# Patient Record
Sex: MALE | Race: WHITE | NOT HISPANIC OR LATINO | URBAN - METROPOLITAN AREA
[De-identification: names, ages, dates, MRNs, and addresses within clinical notes are randomized per-mention and may not be internally consistent; named-entity substitution may affect disease eponyms.]

---

## 2023-03-10 ENCOUNTER — OFFICE VISIT (OUTPATIENT)
Dept: CARDIOTHORACIC SURGERY | Facility: CLINIC | Age: 63
End: 2023-03-10
Payer: MEDICARE

## 2023-03-10 DIAGNOSIS — I35.0 NONRHEUMATIC AORTIC VALVE STENOSIS: Primary | ICD-10-CM

## 2023-03-10 PROCEDURE — 99205 OFFICE O/P NEW HI 60 MIN: CPT | Mod: S$GLB,,, | Performed by: THORACIC SURGERY (CARDIOTHORACIC VASCULAR SURGERY)

## 2023-03-10 PROCEDURE — 99205 PR OFFICE/OUTPT VISIT, NEW, LEVL V, 60-74 MIN: ICD-10-PCS | Mod: S$GLB,,, | Performed by: THORACIC SURGERY (CARDIOTHORACIC VASCULAR SURGERY)

## 2023-03-10 RX ORDER — LEVOTHYROXINE SODIUM 150 UG/1
150 TABLET ORAL
COMMUNITY

## 2023-03-10 RX ORDER — ATORVASTATIN CALCIUM 80 MG/1
80 TABLET, FILM COATED ORAL DAILY
COMMUNITY

## 2023-03-10 RX ORDER — ASPIRIN 81 MG/1
81 TABLET ORAL DAILY
COMMUNITY

## 2023-03-10 RX ORDER — METOPROLOL TARTRATE 25 MG/1
25 TABLET, FILM COATED ORAL DAILY
COMMUNITY

## 2023-03-10 NOTE — PROGRESS NOTES
.  Subjective:      Patient ID: Francisco Mcgovern is a 62 y.o. male who presents for evaluation of aortic stenosis coronary artery disease    Chief Complaint: new patient    Patient is a 62-year-old male whose past medical history including hypertension, hyperlipidemia, diabetes type 2 with hemoglobin A1c of 7%, prior smoking, CAD showing three-vessel coronary disease, right dominant, total occlusion of the mid LAD, chronic occlusion of the proximal RCA, moderate stenosis of 1st diagonal branch, and patent stent of the circumflex and obtuse marginal 1 via LHC done on 2/2/23, aortic valve stenosis with echo done in January 2023 showing maximum velocity across the aortic valve is 4.17, mean gradient of 39.25, and aortic root of 3.9 cm, presented to our clinic as an outpatient referral from Dr. Angeles's office for aortic stenosis and coronary artery disease.  Patient has occasional chest pain on and off with dizziness and relieved with rest.  Does have occasional leg edema.    Review of Systems   Constitutional: Negative.   HENT: Negative.     Eyes: Negative.    Cardiovascular:  Positive for dyspnea on exertion, leg swelling (occasionally, at the end of the day mostly.) and syncope (near-syncope).   Respiratory:  Positive for shortness of breath (With exertion, relieved with rest).    Skin: Negative.    Gastrointestinal: Negative.    Genitourinary: Negative.    Neurological:  Positive for dizziness and light-headedness.   Psychiatric/Behavioral: Negative.      Past Medical History:   Diagnosis Date    Hyperlipidemia     Hypertension     Hypothyroidism, unspecified       Past Surgical History:   Procedure Laterality Date    TOTAL SHOULDER ARTHROPLASTY Right       Family History   Problem Relation Age of Onset    Aneurysm Mother     Diabetes Maternal Grandmother     Diabetes Paternal Grandmother       Social History     Socioeconomic History    Marital status: Unknown   Tobacco Use    Smoking status: Every Day      Packs/day: 0.50     Years: 40.00     Pack years: 20.00     Types: Cigarettes   Substance and Sexual Activity    Alcohol use: Not Currently    Drug use: Yes     Types: Marijuana        Medication List with Changes/Refills   Current Medications    ASPIRIN (ECOTRIN) 81 MG EC TABLET    Take 81 mg by mouth once daily.    ATORVASTATIN (LIPITOR) 80 MG TABLET    Take 80 mg by mouth once daily.    LEVOTHYROXINE (SYNTHROID) 150 MCG TABLET    Take 150 mcg by mouth before breakfast.    METOPROLOL TARTRATE (LOPRESSOR) 25 MG TABLET    Take 25 mg by mouth once daily.        Objective:     There were no vitals taken for this visit.    Physical Exam  Vitals and nursing note reviewed.   Constitutional:       Appearance: Normal appearance. He is normal weight.   HENT:      Head: Normocephalic and atraumatic.   Eyes:      Extraocular Movements: Extraocular movements intact.      Pupils: Pupils are equal, round, and reactive to light.   Cardiovascular:      Rate and Rhythm: Normal rate and regular rhythm.   Pulmonary:      Effort: Pulmonary effort is normal.      Breath sounds: Normal breath sounds.   Abdominal:      General: Bowel sounds are normal.      Palpations: Abdomen is soft.   Musculoskeletal:         General: Normal range of motion.      Cervical back: Neck supple.   Skin:     General: Skin is warm and dry.   Neurological:      General: No focal deficit present.      Mental Status: He is alert and oriented to person, place, and time. Mental status is at baseline.   Psychiatric:         Mood and Affect: Mood normal.         Behavior: Behavior normal.         Thought Content: Thought content normal.         Judgment: Judgment normal.      Cardiology Report - ECHOCARDIOGRAM    Signed    Exam Date: 03/01/23   Exam CPT: 12655  Exam:   [id number] CARD/ECHO TT w/2DMM flow / doppler                         [facility]  [place]    ----------------------------------------------------------------------------  TRANSTHORACIC ECHOCARDIOGRAM    Pt Name:  Francisco Mcgovern      Study Date:   2023  MR:       [id number]            Study Time:   09:47:05 AM  ACCT:     [id number]          Accession#:   [id number]  :      1960            Pt Location:  [place] [address]  Age:      62                    HT/WT:        (67 in) (203.6 lb)  Gender:   M                     BP:  ORDERING PHYSICIAN:   Solitario Massey MD    DICTATING PHYSICIAN:  Solitario Massey MD  SONOGRAPHER:          Ramona Marr    ----------------------------------------------------------------------------  INDICATIONS:   Aortic Stenosis.    ----------------------------------------------------------------------------  STUDY DATA:   Procedure:  Transthoracic echocardiography was performed for  diagnosis, ventricular function evaluation, and assessment of valvular  function. Image quality was fair. Scanning was performed from the  parasternal, apical, and subcostal acoustic windows.  Study component:  M-mode, 2D, complete spectral Doppler, and color Doppler.  Study status:  Routine.  Patient status:  Outpatient.  Location:  Echo laboratory.  Study  completion:  The patient tolerated the procedure well.    ----------------------------------------------------------------------------  CONCLUSIONS    SUMMARY:    1.  Left ventricle: The cavity size is normal. Wall thickness is mildly to      moderately increased. Systolic function is normal by the biplane method      of disks. The estimated ejection fraction is 60-65%. Doppler parameters      are consistent with abnormal left ventricular relaxation (grade 1      diastolic dysfunction).  2.  Right ventricle: The cavity size is mildly increased. Wall thickness is      normal. Systolic function is normal. Systolic pressure is within the      normal range. The RV pressure during systole by Doppler is 32 mm Hg.  3.  Left atrium: The  atrium is normal in size. The end-systolic volume index      (2-plane Leija's) is 22 ml/m^2.  4.  Right atrium: The atrium is normal in size.  5.  Mitral valve: The leaflets are mildly thickened. There is no evidence      for stenosis. There is no significant regurgitation.  6.  Aortic valve: TrileafletThe leaflets are moderately thickened and      moderately calcified. There is moderate stenosis. There is mild to      moderate regurgitation. The mean systolic gradient is 24 mm Hg. The      valve area by the velocity-time integral method is 1.1 cm^2.  7.  Tricuspid valve: There is no evidence for stenosis. There is trivial      regurgitation.  8.  Pulmonic valve: There is no evidence for stenosis. There is no      significant regurgitation.  9.  Aorta: The aorta is normal and normal size.  10. Aortic root: The aortic root is normal.  11. Pericardium, extracardiac: A trivial pericardial effusion is identified      posterior to the heart.  12. Inferior vena cava: The vessel is dilated. The internal diameter is 2.1      cm.      Carotid ultrasound:    IMPRESSION:    By peak systolic velocity criteria, there is no hemodynamically significant carotid stenosis.    Validated velocity measurements with angiographic measurements, velocity criteria are extrapolated from diameter data as defined by the Society of Radiologists in Ultrasound Consensus Conference Radiology 2003; 229; 340-346.    Electronically signed by: Cindy Membreno MD (3/1/2023 2:06 PM)  DICTATING PHYSICIAN:  CINDY DORAN MD                Date Dictated: 03/01/23 6243    CCTA    CHEST CT FINDINGS: There are no significant incidental findings noted on review of the noncardiac chest CT images    IMPRESSION: Aortic valve stenosis.  Significant proximal obstructive coronary disease likely in the right coronary and LAD.  Patent stent in the circumflex.  Normal left ventricular function    RECOMMENDATIONS: Correlate with clinical findings.  Need for  revascularization in the right and LAD is likely    cc: ; ; ;    DISCLAIMER: This examination is intended for the review of cardiovascular structures only and does not represent a diagnostic interpretation of the noncardiac chest CT images.  DICTATING PHYSICIAN:  SOLEDAD LINDA MD                Date Dictated: 03/01/23 1753      Assessment & Plan:     Severe aortic stenosis    Multivessel CAD     Diabetes type 2-diet control    Hypotension hyperlipidemia    Osteoarthritis of the knees, left greater than right    Plans:      We have reviewed patient's left heart catheterization and 2D echocardiogram.  Patient has severe aortic stenosis and CAD that need revascularization, especially the LAD and PDA.  Given his young age with good functional capacity, we have explained to patient in details different options of TAVR versus SAVR and coronary artery bypass (LAD/PDA).  Also explained to patient bioprosthetic versus  Mechanical valve along with pros and cons of each type of valve.  Coumadin side effects and cost also explained to patient if mechanical valve is elected.  The risks and benefits of surgery were explained the patient the risks include but not limited to bleeding, infection, renal failure, pulmonary failure, stroke and death.  She understood all the risk and decided to proceed with surgery. Patient wants to proceed with SAVR and CABG as soon as possible.  He plans to have left knee surgery after heart surgery is done.  Plan is to schedule patient for surgery on March 20, 2023.           Nino Regan MD

## 2023-03-15 ENCOUNTER — CLINICAL SUPPORT (OUTPATIENT)
Dept: CARDIOTHORACIC SURGERY | Facility: CLINIC | Age: 63
End: 2023-03-15
Payer: MEDICARE

## 2023-03-15 DIAGNOSIS — I35.0 NONRHEUMATIC AORTIC VALVE STENOSIS: Primary | ICD-10-CM

## 2023-03-15 LAB
ABS NRBC COUNT: 0 THOU/UL (ref 0–0.01)
ABSOLUTE BASOPHIL: 0.1 10*3/UL (ref 0–0.3)
ABSOLUTE EOSINOPHIL: 0.2 10*3/UL (ref 0–0.6)
ABSOLUTE IMMATURE GRAN: 0.02 THOU/UL (ref 0–0.03)
ABSOLUTE LYMPHOCYTE: 1.8 10*3/UL (ref 1.2–4)
ABSOLUTE MONOCYTE: 0.5 10*3/UL (ref 0.1–0.8)
ALBUMIN SERPL BCP-MCNC: 4.2 G/DL (ref 3.4–5)
ALP SERPL-CCNC: 78 U/L (ref 45–117)
ALT SERPL W P-5'-P-CCNC: 32 U/L (ref 16–61)
ANION GAP SERPL CALC-SCNC: 7 MMOL/L (ref 3–11)
APPEARANCE, UA: CLEAR
APTT PPP: 35.1 SEC (ref 25.7–36.7)
AST SERPL-CCNC: 25 U/L (ref 15–37)
BASOPHILS NFR BLD: 1.3 % (ref 0–3)
BILIRUB SERPL-MCNC: 0.4 MG/DL (ref 0.2–1)
BILIRUB UR QL STRIP: NEGATIVE
BUN SERPL-MCNC: 15 MG/DL (ref 7–18)
BUN/CREAT SERPL: 19.23 RATIO
CALCIUM SERPL-MCNC: 9 MG/DL (ref 8.5–10.1)
CHLORIDE SERPL-SCNC: 108 MMOL/L (ref 98–107)
CO2 SERPL-SCNC: 25 MMOL/L (ref 21–32)
COLOR UR: NORMAL
CREAT SERPL-MCNC: 0.78 MG/DL (ref 0.7–1.3)
EOSINOPHIL NFR BLD: 2.8 % (ref 0–6)
ERYTHROCYTE [DISTWIDTH] IN BLOOD BY AUTOMATED COUNT: 13.4 % (ref 0–15.5)
ESTIMATED AVG GLUCOSE: 215 MG/DL
GFR ESTIMATION: > 60
GLUCOSE (UA): NEGATIVE MG/DL
GLUCOSE SERPL-MCNC: 142 MG/DL (ref 74–106)
HBA1C MFR BLD: 8.2 % (ref 4.2–6.3)
HCT VFR BLD AUTO: 47.6 % (ref 42–52)
HGB BLD-MCNC: 15.6 G/DL (ref 14–18)
HGB UR QL STRIP: NEGATIVE
IMMATURE GRANULOCYTES: 0.3 % (ref 0–0.43)
INR PPP: 1 INR (ref 0.9–1.1)
KETONES UR QL STRIP: NEGATIVE MG/DL
LEUKOCYTE ESTERASE UR QL STRIP: NEGATIVE LEU/UL
LYMPHOCYTES NFR BLD: 29.3 % (ref 20–45)
MCH RBC QN AUTO: 28.9 PG (ref 27–32)
MCHC RBC AUTO-ENTMCNC: 32.8 % (ref 32–36)
MCV RBC AUTO: 88.3 FL (ref 80–97)
MONOCYTES NFR BLD: 8.1 % (ref 2–10)
NEUTROPHILS # BLD AUTO: 3.6 10*3/UL (ref 1.4–7)
NEUTROPHILS NFR BLD: 58.2 % (ref 50–80)
NITRITE UR QL STRIP: NEGATIVE
NUCLEATED RED BLOOD CELLS: 0 % (ref 0–0.2)
PH UR STRIP: 6 PH (ref 5–8)
PLATELETS: 158 10*3/UL (ref 130–400)
PMV BLD AUTO: 10.2 FL (ref 9.2–12.2)
POTASSIUM SERPL-SCNC: 4.1 MMOL/L (ref 3.5–5.1)
PROT SERPL-MCNC: 7.2 G/DL (ref 6.4–8.2)
PROT UR QL STRIP: NEGATIVE MG/DL
PROTHROMBIN TIME: 11 SEC (ref 10.2–12.9)
RBC # BLD AUTO: 5.39 10*6/UL (ref 4.7–6.1)
SODIUM BLD-SCNC: 140 MMOL/L (ref 131–143)
SP GR UR STRIP: 1.01 (ref 1–1.03)
TSH SERPL DL<=0.005 MIU/L-ACNC: 2.55 UIU/ML (ref 0.36–3.74)
UROBILINOGEN UR STRIP-ACNC: NORMAL MG/DL
WBC # BLD: 6.2 10*3/UL (ref 4.5–10)

## 2023-03-20 ENCOUNTER — OUTSIDE PLACE OF SERVICE (OUTPATIENT)
Dept: CARDIOTHORACIC SURGERY | Facility: CLINIC | Age: 63
End: 2023-03-20

## 2023-03-20 PROCEDURE — 33518 CABG ARTERY-VEIN TWO: CPT | Mod: ,,, | Performed by: THORACIC SURGERY (CARDIOTHORACIC VASCULAR SURGERY)

## 2023-03-20 PROCEDURE — 33533 CABG ARTERIAL SINGLE: CPT | Mod: 51,,, | Performed by: THORACIC SURGERY (CARDIOTHORACIC VASCULAR SURGERY)

## 2023-03-20 PROCEDURE — 33405 REPLACEMENT AORTIC VALVE OPN: CPT | Mod: ,,, | Performed by: THORACIC SURGERY (CARDIOTHORACIC VASCULAR SURGERY)

## 2023-03-20 PROCEDURE — 33508 PR ENDOSCOPY W/VIDEO-ASST VEIN HARVEST,CABG: ICD-10-PCS | Mod: 59,,, | Performed by: THORACIC SURGERY (CARDIOTHORACIC VASCULAR SURGERY)

## 2023-03-20 PROCEDURE — 33518 PR CABG, ARTERY-VEIN, TWO: ICD-10-PCS | Mod: ,,, | Performed by: THORACIC SURGERY (CARDIOTHORACIC VASCULAR SURGERY)

## 2023-03-20 PROCEDURE — 33533 PR CABG, ARTERIAL, SINGLE: ICD-10-PCS | Mod: 51,,, | Performed by: THORACIC SURGERY (CARDIOTHORACIC VASCULAR SURGERY)

## 2023-03-20 PROCEDURE — 33508 ENDOSCOPIC VEIN HARVEST: CPT | Mod: 59,,, | Performed by: THORACIC SURGERY (CARDIOTHORACIC VASCULAR SURGERY)

## 2023-03-20 PROCEDURE — 33405 PR REPLACE AORT VALV,PROSTH VALV: ICD-10-PCS | Mod: ,,, | Performed by: THORACIC SURGERY (CARDIOTHORACIC VASCULAR SURGERY)

## 2023-03-24 ENCOUNTER — TELEPHONE (OUTPATIENT)
Dept: CARDIOTHORACIC SURGERY | Facility: CLINIC | Age: 63
End: 2023-03-24
Payer: MEDICARE

## 2023-03-24 NOTE — TELEPHONE ENCOUNTER
Called an talked to Daughter about her father. She is just worried as to what will happen to her dad if he refused to to go to rehab. Advised the daughter to contact the nurses station there and find out who she needs to talk to, that [facility] is the ones that shes needs to talk to about this situation. She understood and was greatful just to talk to someone because it was very heartful and she just needed someone to listen. sj      ----- Message from Venita Quinteros sent at 3/24/2023  9:44 AM CDT -----  Contact: pt  Pt hang Up calling about pt living conditions and where pt has to go after surgery. She is also calling about co-pay for rehab.   She can be reach at [phone].    Thanks,

## 2023-03-28 ENCOUNTER — OUTSIDE PLACE OF SERVICE (OUTPATIENT)
Dept: CARDIOTHORACIC SURGERY | Facility: CLINIC | Age: 63
End: 2023-03-28
Payer: MEDICARE

## 2023-03-28 ENCOUNTER — TELEPHONE (OUTPATIENT)
Dept: CARDIOTHORACIC SURGERY | Facility: CLINIC | Age: 63
End: 2023-03-28
Payer: MEDICARE

## 2023-03-28 PROCEDURE — 99024 POSTOP FOLLOW-UP VISIT: CPT | Mod: ,,, | Performed by: THORACIC SURGERY (CARDIOTHORACIC VASCULAR SURGERY)

## 2023-03-28 PROCEDURE — 99024 PR POST-OP FOLLOW-UP VISIT: ICD-10-PCS | Mod: ,,, | Performed by: THORACIC SURGERY (CARDIOTHORACIC VASCULAR SURGERY)

## 2023-03-28 NOTE — TELEPHONE ENCOUNTER
Pt scheduled and notified via voicemail  ----- Message from Estefany Sotomayor sent at 3/28/2023 11:32 AM CDT -----  Regarding: hospital follow up  Contact: Yue  Per phone call with Yue from [facility] the PT needs a hospital follow up appt 1 week from today , he had a cabbage , open heart surgery done, return call to [phone]

## 2023-03-29 ENCOUNTER — TELEPHONE (OUTPATIENT)
Dept: CARDIOTHORACIC SURGERY | Facility: CLINIC | Age: 63
End: 2023-03-29
Payer: MEDICARE

## 2023-03-29 NOTE — TELEPHONE ENCOUNTER
----- Message from Estefany Sotomayor sent at 3/29/2023  1:02 PM CDT -----  Regarding: post op home health  Contact: rosalina PEDERSEN is calling bc he had open heart surgery and is stating no one from Home Health has reached out to hi yet and wanting to know what is going on about his wound care, return call [phone].

## 2023-03-29 NOTE — TELEPHONE ENCOUNTER
Spoke with NP who said she would reach out to hospital coordinated care and have them call patient    ----- Message from Estefany Sotomayor sent at 3/29/2023  1:02 PM CDT -----  Regarding: post op home health  Contact: rosalina PEDERSEN is calling bc he had open heart surgery and is stating no one from Home Health has reached out to hi yet and wanting to know what is going on about his wound care, return call [phone].

## 2023-04-04 ENCOUNTER — CLINICAL SUPPORT (OUTPATIENT)
Dept: CARDIOTHORACIC SURGERY | Facility: CLINIC | Age: 63
End: 2023-04-04
Payer: MEDICARE

## 2023-04-04 ENCOUNTER — HOSPITAL ENCOUNTER (OUTPATIENT)
Dept: RADIOLOGY | Facility: CLINIC | Age: 63
Discharge: HOME | End: 2023-04-04
Attending: THORACIC SURGERY (CARDIOTHORACIC VASCULAR SURGERY)
Payer: MEDICARE

## 2023-04-04 VITALS
DIASTOLIC BLOOD PRESSURE: 70 MMHG | HEIGHT: 67 IN | SYSTOLIC BLOOD PRESSURE: 125 MMHG | RESPIRATION RATE: 18 BRPM | OXYGEN SATURATION: 93 % | HEART RATE: 67 BPM | BODY MASS INDEX: 31.18 KG/M2 | WEIGHT: 198.69 LBS

## 2023-04-04 DIAGNOSIS — R05.9 COUGH, UNSPECIFIED TYPE: Primary | ICD-10-CM

## 2023-04-04 DIAGNOSIS — R05.9 COUGH, UNSPECIFIED TYPE: ICD-10-CM

## 2023-04-04 PROCEDURE — 71046 X-RAY EXAM CHEST 2 VIEWS: CPT | Mod: 26,,, | Performed by: RADIOLOGY

## 2023-04-04 PROCEDURE — 71046 XR CHEST PA AND LATERAL: ICD-10-PCS | Mod: TC,,, | Performed by: THORACIC SURGERY (CARDIOTHORACIC VASCULAR SURGERY)

## 2023-04-04 PROCEDURE — 71046 XR CHEST PA AND LATERAL: ICD-10-PCS | Mod: 26,,, | Performed by: RADIOLOGY

## 2023-04-04 PROCEDURE — 71046 X-RAY EXAM CHEST 2 VIEWS: CPT | Mod: TC,,, | Performed by: THORACIC SURGERY (CARDIOTHORACIC VASCULAR SURGERY)

## 2023-04-04 RX ORDER — TAMSULOSIN HYDROCHLORIDE 0.4 MG/1
CAPSULE ORAL DAILY
COMMUNITY

## 2023-04-04 RX ORDER — FUROSEMIDE 40 MG/1
20 TABLET ORAL DAILY
COMMUNITY

## 2023-04-04 RX ORDER — AMIODARONE HYDROCHLORIDE 200 MG/1
TABLET ORAL DAILY
COMMUNITY

## 2023-04-04 RX ORDER — POTASSIUM CHLORIDE 750 MG/1
10 CAPSULE, EXTENDED RELEASE ORAL ONCE
COMMUNITY

## 2023-04-04 RX ORDER — LANOLIN ALCOHOL/MO/W.PET/CERES
1 CREAM (GRAM) TOPICAL
COMMUNITY

## 2023-04-04 RX ORDER — HYDROCODONE BITARTRATE AND ACETAMINOPHEN 5; 325 MG/1; MG/1
1 TABLET ORAL EVERY 6 HOURS PRN
COMMUNITY

## 2023-04-04 RX ORDER — IBUPROFEN 100 MG/5ML
1000 SUSPENSION, ORAL (FINAL DOSE FORM) ORAL DAILY
COMMUNITY

## 2023-04-04 RX ORDER — DOCUSATE SODIUM 100 MG/1
100 CAPSULE, LIQUID FILLED ORAL 2 TIMES DAILY
COMMUNITY

## 2023-04-04 RX ORDER — FINASTERIDE 5 MG/1
5 TABLET, FILM COATED ORAL DAILY
COMMUNITY

## 2023-04-04 RX ORDER — WARFARIN 4 MG/1
4 TABLET ORAL DAILY
COMMUNITY

## 2023-04-04 RX ORDER — CEPHALEXIN 500 MG/1
500 CAPSULE ORAL EVERY 6 HOURS
COMMUNITY

## 2023-04-04 RX ORDER — PANTOPRAZOLE SODIUM 40 MG/1
40 TABLET, DELAYED RELEASE ORAL DAILY
COMMUNITY

## 2023-04-21 ENCOUNTER — CLINICAL SUPPORT (OUTPATIENT)
Dept: CARDIOTHORACIC SURGERY | Facility: CLINIC | Age: 63
End: 2023-04-21
Payer: MEDICARE

## 2023-04-21 VITALS
DIASTOLIC BLOOD PRESSURE: 70 MMHG | WEIGHT: 202.19 LBS | BODY MASS INDEX: 31.67 KG/M2 | HEART RATE: 54 BPM | SYSTOLIC BLOOD PRESSURE: 137 MMHG | RESPIRATION RATE: 18 BRPM | OXYGEN SATURATION: 92 %

## 2023-04-21 DIAGNOSIS — R60.0 FLUID COLLECTION (EDEMA) IN THE ARMS, LEGS, HANDS AND FEET: Primary | ICD-10-CM

## 2023-04-24 NOTE — PROGRESS NOTES
Subjective:      Patient ID: Francisco Mcgovern is a 62 y.o. male who presents for postoperative f/u       Chief Complaint: Post-op Evaluation    HPI Pt is a pleasant 62 years old male s/p CAB/AVR (23mmOnyx mechanical valve) on 3/20/23. Has been doing well. Has home health and has been dosing Coumadin w our clinic. Pending arranging to connect w Coumadin clinic for long term dosing. INR 3.0, which is desired goal for patient.   Review of Systems   Constitutional: Negative.   HENT: Negative.     Eyes: Negative.    Cardiovascular: Negative.    Respiratory: Negative.     Endocrine: Negative.    Skin: Negative.    Musculoskeletal: Negative.    Gastrointestinal: Negative.    Genitourinary: Negative.    Neurological: Negative.    Psychiatric/Behavioral: Negative.      Past Medical History:   Diagnosis Date    Hyperlipidemia     Hypertension     Hypothyroidism, unspecified       Past Surgical History:   Procedure Laterality Date    AORTIC VALVE REPLACEMENT  03/20/2023    CORONARY ARTERY BYPASS GRAFT  03/20/2023    TOTAL SHOULDER ARTHROPLASTY Right       Family History   Problem Relation Age of Onset    Aneurysm Mother     Diabetes Maternal Grandmother     Diabetes Paternal Grandmother       Social History     Socioeconomic History    Marital status:    Tobacco Use    Smoking status: Former     Packs/day: 0.50     Years: 40.00     Pack years: 20.00     Types: Cigarettes   Substance and Sexual Activity    Alcohol use: Not Currently    Drug use: Yes     Types: Marijuana        Medication List with Changes/Refills   Current Medications    AMIODARONE (PACERONE) 200 MG TAB    Take by mouth once daily.    ASCORBIC ACID, VITAMIN C, (VITAMIN C) 1000 MG TABLET    Take 1,000 mg by mouth once daily.    ASPIRIN (ECOTRIN) 81 MG EC TABLET    Take 81 mg by mouth once daily.    ATORVASTATIN (LIPITOR) 80 MG TABLET    Take 80 mg by mouth once daily.    CEPHALEXIN (KEFLEX) 500 MG CAPSULE    Take 500 mg by mouth every 6 (six) hours.     DOCUSATE SODIUM (COLACE) 100 MG CAPSULE    Take 100 mg by mouth 2 (two) times daily.    EMPAGLIFLOZIN (JARDIANCE) 10 MG TABLET    Take 10 mg by mouth once daily.    FERROUS SULFATE (FEOSOL) TAB TABLET    Take 1 tablet by mouth daily with breakfast.    FINASTERIDE (PROSCAR) 5 MG TABLET    Take 5 mg by mouth once daily.    FUROSEMIDE (LASIX) 40 MG TABLET    Take 20 mg by mouth once daily.    HYDROCODONE-ACETAMINOPHEN (NORCO) 5-325 MG PER TABLET    Take 1 tablet by mouth every 6 (six) hours as needed for Pain.    LEVOTHYROXINE (SYNTHROID) 150 MCG TABLET    Take 150 mcg by mouth before breakfast.    METOPROLOL TARTRATE (LOPRESSOR) 25 MG TABLET    Take 25 mg by mouth once daily.    PANTOPRAZOLE (PROTONIX) 40 MG TABLET    Take 40 mg by mouth once daily.    POTASSIUM CHLORIDE (MICRO-K) 10 MEQ CPSR    Take 10 mEq by mouth once.    TAMSULOSIN (FLOMAX) 0.4 MG CAP    Take by mouth once daily.    WARFARIN (COUMADIN) 4 MG TABLET    Take 4 mg by mouth once daily.        Objective:     /70   Pulse (!) 54   Resp 18   Wt 91.7 kg (202 lb 3.2 oz)   SpO2 (!) 92%   BMI 31.67 kg/m²     Physical Exam  Constitutional:       Appearance: Normal appearance. He is normal weight.   HENT:      Head: Normocephalic and atraumatic.   Eyes:      Extraocular Movements: Extraocular movements intact.      Pupils: Pupils are equal, round, and reactive to light.   Cardiovascular:      Rate and Rhythm: Normal rate and regular rhythm.   Pulmonary:      Effort: Pulmonary effort is normal.      Breath sounds: Normal breath sounds.   Abdominal:      General: Abdomen is flat. Bowel sounds are normal.      Palpations: Abdomen is soft.   Musculoskeletal:         General: Normal range of motion.      Cervical back: Normal range of motion and neck supple.   Skin:     General: Skin is warm and dry.   Neurological:      General: No focal deficit present.      Mental Status: He is alert and oriented to person, place, and time. Mental status is at baseline.    Psychiatric:         Mood and Affect: Mood normal.         Behavior: Behavior normal.         Thought Content: Thought content normal.         Judgment: Judgment normal.          X-Ray Chest PA And Lateral  Narrative: Exam:  XR CHEST PA AND LATERAL    History: [R05.9]-Cough, unspecified.    TECHNIQUE: Chest x-ray, 3 views.    Comparison exam: None    Findings:     Object overlying the bilateral chest, located outside of the patient.  Moderate-sized hazy opacity within the left perihilar mid lung suspicious for infiltrate.  Additional mild blunting left costophrenic angle likely represents a small effusion.  Right lung clear.  Cardiac silhouette within normal limits for size.  Mediastinal wires noted.  Right shoulder prosthesis noted.  Impression: 1.  Moderate sized hazy increased density within the left perihilar midlung suspicious for left upper lobe infiltrate.  Additional probable small left pleural effusion.    Finalized on: 4/4/2023 2:28 PM By:  Chino Wills MD  [facility]# [id number]      2023-04-04 14:30:15.885    [facility]     No image results found.           Assessment & Plan:     Assessments:    CAD and severe aortic stenosis, status post AVR/CABG x3 on 3/20/23    Hypertension    Hyperlipidemia    Tobacco dependence/history of marijuana use     Plans:    Pt's INR is therapeutic at this time. Patient is s/p 30 days postop AVR/CAB and is doing well. Awaiting transitioning to Coumadin clinic for dosing long-term.  Plan to discontinue amiodarone and follow-up with cardiology as well as PCP.  Follow-up with our clinic for surgical related issues.      Jennifer Jarvis NP  Cardiothoracic and Vascular Surgery

## 2023-05-02 ENCOUNTER — TELEPHONE (OUTPATIENT)
Dept: CARDIOTHORACIC SURGERY | Facility: CLINIC | Age: 63
End: 2023-05-02
Payer: MEDICARE

## 2023-05-02 NOTE — TELEPHONE ENCOUNTER
Pt states he seen Susan Husseinn 2 weeks ago and she sent a message to get him into the coumadin clinic and he's still waiting to hear from them. Advised Nps on protocol and contact info and will let him know what is advised.  ----- Message from Nikkie Carbajal sent at 5/2/2023 11:44 AM CDT -----  Patient is calling in regards to will need his coumadin check..Please call him back at [phone]

## 2023-05-05 ENCOUNTER — TELEPHONE (OUTPATIENT)
Dept: CARDIOTHORACIC SURGERY | Facility: CLINIC | Age: 63
End: 2023-05-05
Payer: MEDICARE

## 2023-05-05 NOTE — TELEPHONE ENCOUNTER
Not sure who Eneida is with no phone number left to call , pt has no release of information in his chart  ----- Message from Nikkie Carbajal sent at 5/5/2023 11:23 AM CDT -----  Eneida is clling in regards to patient chart records will need them fax to [phone]